# Patient Record
Sex: MALE | Race: WHITE | URBAN - METROPOLITAN AREA
[De-identification: names, ages, dates, MRNs, and addresses within clinical notes are randomized per-mention and may not be internally consistent; named-entity substitution may affect disease eponyms.]

---

## 2017-10-03 ENCOUNTER — OFFICE VISIT (OUTPATIENT)
Dept: URGENT CARE | Facility: URGENT CARE | Age: 32
End: 2017-10-03
Payer: COMMERCIAL

## 2017-10-03 ENCOUNTER — RADIANT APPOINTMENT (OUTPATIENT)
Dept: GENERAL RADIOLOGY | Facility: CLINIC | Age: 32
End: 2017-10-03
Attending: PHYSICIAN ASSISTANT
Payer: COMMERCIAL

## 2017-10-03 VITALS
SYSTOLIC BLOOD PRESSURE: 124 MMHG | HEART RATE: 76 BPM | DIASTOLIC BLOOD PRESSURE: 80 MMHG | WEIGHT: 168 LBS | BODY MASS INDEX: 21.57 KG/M2

## 2017-10-03 DIAGNOSIS — S99.912A INJURY OF LEFT ANKLE, INITIAL ENCOUNTER: Primary | ICD-10-CM

## 2017-10-03 DIAGNOSIS — S99.912A INJURY OF LEFT ANKLE, INITIAL ENCOUNTER: ICD-10-CM

## 2017-10-03 PROCEDURE — 99203 OFFICE O/P NEW LOW 30 MIN: CPT | Performed by: PHYSICIAN ASSISTANT

## 2017-10-03 PROCEDURE — 73610 X-RAY EXAM OF ANKLE: CPT | Mod: LT

## 2017-10-03 NOTE — MR AVS SNAPSHOT
"              After Visit Summary   10/3/2017    Davon Brewster    MRN: [id number]           Patient Information     Date Of Birth          1985        Visit Information        Provider Department      10/3/2017 7:35 PM Arabella Cuellar PA-C [facility]        Today's Diagnoses     Injury of left ankle, initial encounter    -  1      Care Instructions    (S99.912A) Injury of left ankle, initial encounter  (primary encounter diagnosis)  Comment:   Plan: XR Ankle Left G/E 3 Views, order for DME        Rest, Ice, elevate.  Compress  Cam walker for 2 weeks, follow up with Ortho should symptoms persist or worsen.                  Follow-ups after your visit        Who to contact     If you have questions or need follow up information about today's clinic visit or your schedule please contact [facility] directly at [phone].  Normal or non-critical lab and imaging results will be communicated to you by MyChart, letter or phone within 4 business days after the clinic has received the results. If you do not hear from us within 7 days, please contact the clinic through MyChart or phone. If you have a critical or abnormal lab result, we will notify you by phone as soon as possible.  Submit refill requests through [employer] or call your pharmacy and they will forward the refill request to us. Please allow 3 business days for your refill to be completed.          Additional Information About Your Visit        MyChart Information     [facility] lets you send messages to your doctor, view your test results, renew your prescriptions, schedule appointments and more. To sign up, go to [email or web address] . Click on \"Log in\" on the left side of the screen, which will take you to the Welcome page. Then click on \"Sign up Now\" on the right side of the page.     You will be asked to enter the access code listed below, as well as some personal information. Please " follow the directions to create your username and password.     Your access code is: [email or web address]  Expires: 2018  8:55 PM     Your access code will  in 90 days. If you need help or a new code, please call your [facility] clinic or [phone].        Care EveryWhere ID     This is your Care EveryWhere ID. This could be used by other organizations to access your [facility] medical records  [id number]        Your Vitals Were     Pulse BMI (Body Mass Index)                76 21.57 kg/m2           Blood Pressure from Last 3 Encounters:   10/03/17 124/80   10/09/16 155/85    Weight from Last 3 Encounters:   10/03/17 168 lb (76.2 kg)   10/09/16 170 lb (77.1 kg)                 Today's Medication Changes          These changes are accurate as of: 10/3/17  8:55 PM.  If you have any questions, ask your nurse or doctor.               Start taking these medicines.        Dose/Directions    order for DME   Used for:  Injury of left ankle, initial encounter   Started by:  Arabella Cuellar PA-C        Equipment being ordered: short cam walker   Quantity:  1 Device   Refills:  0            Where to get your medicines      Some of these will need a paper prescription and others can be bought over the counter.  Ask your nurse if you have questions.     Bring a paper prescription for each of these medications     order for DME                Primary Care Provider    None Specified       No primary provider on file.        Equal Access to Services     LEYDI CHANEY : Alejo Campbell, hussein palmer, qageri haider. So [facility] [phone].    ATENCIÓN: Si habla español, tiene a de guzman disposición servicios gratuitos de asistencia lingüística. Llame al [phone].    We comply with applicable federal civil rights laws and [place] laws. We do not discriminate on the basis of race, color, national origin, age, disability, sex, sexual orientation, or gender  identity.            Thank you!     Thank you for choosing [facility]  for your care. Our goal is always to provide you with excellent care. Hearing back from our patients is one way we can continue to improve our services. Please take a few minutes to complete the written survey that you may receive in the mail after your visit with us. Thank you!             Your Updated Medication List - Protect others around you: Learn how to safely use, store and throw away your medicines at www.disposemymeds.org.          This list is accurate as of: 10/3/17  8:55 PM.  Always use your most recent med list.                   Brand Name Dispense Instructions for use Diagnosis    order for DME     1 Device    Equipment being ordered: short cam walker    Injury of left ankle, initial encounter

## 2017-10-04 NOTE — PATIENT INSTRUCTIONS
(S45.804X) Injury of left ankle, initial encounter  (primary encounter diagnosis)  Comment:   Plan: XR Ankle Left G/E 3 Views, order for DME        Rest, Ice, elevate.  Compress  Cam walker for 2 weeks, follow up with Ortho should symptoms persist or worsen.

## 2017-10-04 NOTE — PROGRESS NOTES
SUBJECTIVE:  Chief Complaint   Patient presents with     Ankle Pain     rolled lt ankle 1 hr ago,pain,swelling     Davon Brewster is a 32 year old male presents with a chief complaint of right ankle pain and swelling since he inverted it while doing tricks on his [employer] bike.    Denies any other injuries.  Pain with weight bearing.    How: as above.  The patient complained of moderate pain  and has had decreased ROM.  Pain exacerbated by weight-bearing.  Relieved by nothing.  He treated it initially with ice. This is not the first time this type of injury has occurred to this patient.     No past medical history on file.     Denies any significant PMH    There is no problem list on file for this patient.    Social History   Substance Use Topics     Smoking status: Never Smoker     Smokeless tobacco: Never Used     Alcohol use Not on file       ROS:  CONSTITUTIONAL:NEGATIVE for fever, chills, change in weight  INTEGUMENTARY/SKIN: NEGATIVE for worrisome rashes, moles or lesions  MUSCULOSKELETAL: as per HPI  NEURO: NEGATIVE for weakness, dizziness or paresthesias    EXAM:   /80 (BP Location: Left arm, Patient Position: Sitting, Cuff Size: Adult Regular)  Pulse 76  Wt 168 lb (76.2 kg)  BMI 21.57 kg/m2  Gen: healthy,alert,no distress  Extremity: right ankle:  Swelling and tenderness at lateral malleolus.  Joint is stable.    There is not compromise to the distal circulation.  GENERAL APPEARANCE: healthy, alert and no distress  SKIN: no suspicious lesions or rashes  NEURO: Normal strength and tone, sensory exam grossly normal, mentation intact and speech normal    X-RAY was done.    LEFT ANKLE THREE OR MORE VIEWS  10/3/2017 8:17 PM      HISTORY: Unspecified injury of left ankle, initial encounter.     COMPARISON: None.     FINDINGS: Soft tissue swelling over the lateral malleolus of the left  ankle. Tiny osseous density inferior to the lateral malleolus appears  to have corticated margins and therefore should not be  an acute  fracture. No definite acute bony abnormality identified.         IMPRESSION: Soft tissue swelling over the lateral malleolar region of  the left ankle but no acute appearing fracture identified.     YUMIKO ELLINGTON MD    S99.912A) Injury of left ankle, initial encounter  (primary encounter diagnosis)  Comment:   Plan: XR Ankle Left G/E 3 Views, order for DME        Rest, Ice, elevate.  Compress  Cam walker for 2 weeks, follow up with Ortho should symptoms persist or worsen.        Patient expresses understanding and agreement with the assessment and plan as above.

## 2017-10-04 NOTE — NURSING NOTE
"Chief Complaint   Patient presents with     Ankle Pain     rolled lt ankle 1 hr ago,pain,swelling       Initial /80 (BP Location: Left arm, Patient Position: Sitting, Cuff Size: Adult Regular)  Pulse 76  Wt 168 lb (76.2 kg)  BMI 21.57 kg/m2 Estimated body mass index is 21.57 kg/(m^2) as calculated from the following:    Height as of 10/9/16: 6' 2\" (1.88 m).    Weight as of this encounter: 168 lb (76.2 kg).  Medication Reconciliation: complete Della BLANCO    "